# Patient Record
Sex: MALE | Race: WHITE | NOT HISPANIC OR LATINO | Employment: FULL TIME | URBAN - METROPOLITAN AREA
[De-identification: names, ages, dates, MRNs, and addresses within clinical notes are randomized per-mention and may not be internally consistent; named-entity substitution may affect disease eponyms.]

---

## 2018-05-15 ENCOUNTER — HOSPITAL ENCOUNTER (OUTPATIENT)
Dept: CARDIOLOGY | Facility: HOSPITAL | Age: 38
Discharge: HOME | End: 2018-05-15
Attending: FAMILY MEDICINE | Admitting: FAMILY MEDICINE

## 2019-03-18 ENCOUNTER — CONVERSION ENCOUNTER (OUTPATIENT)
Dept: FAMILY MEDICINE CLINIC | Facility: CLINIC | Age: 39
End: 2019-03-18

## 2019-03-18 LAB
ALBUMIN SERPL-MCNC: 4 G/DL (ref 3.6–5.1)
ALP SERPL-CCNC: 56 U/L (ref 40–115)
ALT SERPL-CCNC: 19 U/L (ref 9–46)
AST SERPL-CCNC: 22 U/L (ref 10–40)
BASOPHILS # BLD AUTO: 61 CELLS/UL (ref 0–200)
BASOPHILS NFR BLD AUTO: 1.1 %
BILIRUB SERPL-MCNC: 0.6 MG/DL (ref 0.2–1.2)
BUN SERPL-MCNC: 17 MG/DL (ref 7–25)
BUN/CREAT SERPL: NORMAL (CALC) (ref 6–22)
CALCIUM SERPL-MCNC: 8.8 MG/DL (ref 8.6–10.3)
CHLORIDE SERPL-SCNC: 107 MMOL/L (ref 98–110)
CHOLEST SERPL-MCNC: 150 MG/DL
CHOLEST/HDLC SERPL: 2.9 (CALC)
CONV CO2: 26 MMOL/L (ref 20–32)
CONV TOTAL PROTEIN: 6.5 G/DL (ref 6.1–8.1)
CREAT UR-MCNC: 1.05 MG/DL (ref 0.6–1.35)
EOSINOPHIL # BLD AUTO: 171 CELLS/UL (ref 15–500)
EOSINOPHIL # BLD AUTO: 3.1 %
ERYTHROCYTE [DISTWIDTH] IN BLOOD BY AUTOMATED COUNT: 12.7 % (ref 11–15)
GLOBULIN UR ELPH-MCNC: 2.5 MG/DL (ref 1.9–3.7)
GLUCOSE UR QL: 94 MG/DL (ref 65–99)
HBA1C MFR BLD: 5.5 %
HCT VFR BLD AUTO: 39.3 % (ref 38.5–50)
HDLC SERPL-MCNC: 52 MG/DL
HGB BLD-MCNC: 13 G/DL (ref 13.2–17.1)
INSULIN SERPL-ACNC: 1.6 (CALC) (ref 1–2.5)
LDLC SERPL CALC-MCNC: 81 MG/DL
LYMPHOCYTES # BLD AUTO: 1898 CELLS/UL (ref 850–3900)
LYMPHOCYTES NFR BLD AUTO: 34.5 %
MCH RBC QN AUTO: 29.9 PG (ref 27–33)
MCHC RBC AUTO-ENTMCNC: 33.1 G/DL (ref 32–36)
MCV RBC AUTO: 90.3 FL (ref 80–100)
MONOCYTES # BLD AUTO: 429 CELLS/UL (ref 200–950)
MONOCYTES NFR BLD AUTO: 7.8 %
NEUTROPHILS # BLD AUTO: 2943 CELLS/UL (ref 1500–7800)
NEUTROPHILS NFR BLD AUTO: 53.5 %
NONHDLC SERPL-MCNC: 98 MG/DL
PLATELET # BLD AUTO: 319 10*3/UL (ref 140–400)
PMV BLD AUTO: 10.8 FL (ref 7.5–12.5)
POTASSIUM SERPL-SCNC: 4.2 MMOL/L (ref 3.5–5.3)
PSA SERPL-MCNC: 0.8 NG/ML
RBC # BLD AUTO: 4.35 MILLION/UL (ref 4.2–5.8)
SODIUM SERPL-SCNC: 140 MMOL/L (ref 135–146)
TESTOST FREE MFR SERPL: 83.2 PG/ML (ref 35–155)
TESTOST SERPL-MCNC: 792 NG/DL (ref 250–1100)
TRIGL SERPL-MCNC: 87 MG/DL
WBC # BLD AUTO: 5.5 10*3/UL (ref 3.8–10.8)

## 2019-07-15 ENCOUNTER — OFFICE VISIT (OUTPATIENT)
Dept: FAMILY MEDICINE CLINIC | Facility: CLINIC | Age: 39
End: 2019-07-15

## 2019-07-15 VITALS
BODY MASS INDEX: 25.04 KG/M2 | TEMPERATURE: 97.7 F | HEART RATE: 70 BPM | SYSTOLIC BLOOD PRESSURE: 118 MMHG | RESPIRATION RATE: 18 BRPM | HEIGHT: 75 IN | WEIGHT: 201.4 LBS | DIASTOLIC BLOOD PRESSURE: 72 MMHG | OXYGEN SATURATION: 99 %

## 2019-07-15 DIAGNOSIS — Z11.3 SCREEN FOR STD (SEXUALLY TRANSMITTED DISEASE): ICD-10-CM

## 2019-07-15 DIAGNOSIS — N48.9 PENILE LESION: Primary | ICD-10-CM

## 2019-07-15 PROBLEM — I10 HYPERTENSION: Status: ACTIVE | Noted: 2019-07-15

## 2019-07-15 PROBLEM — F43.23 SITUATIONAL MIXED ANXIETY AND DEPRESSIVE DISORDER: Status: ACTIVE | Noted: 2019-07-15

## 2019-07-15 PROCEDURE — 99213 OFFICE O/P EST LOW 20 MIN: CPT | Performed by: FAMILY MEDICINE

## 2019-07-15 RX ORDER — VALACYCLOVIR HYDROCHLORIDE 1 G/1
1000 TABLET, FILM COATED ORAL EVERY 12 HOURS
Qty: 20 TABLET | Refills: 0 | Status: SHIPPED | OUTPATIENT
Start: 2019-07-15 | End: 2019-07-25

## 2019-07-15 NOTE — PROGRESS NOTES
Chief Complaint   Patient presents with   • STD testing     Patient requesting to be tested, possible exposure.   • Back Pain   • Skin Lesion       Subjective   Clifford Gallo is a 39 y.o. male.     Patient presents today to be tested for STDs. He feels he may have been exposed because of current symptoms. He has been experiencing low back pain in the tail bone region starting this past Friday and lesions in scrotum region and inner part of the thigh.  Lesions have been sore.  He does not have a previous history of genital herpes or cold cores.        Back Pain   This is a new problem. The current episode started in the past 7 days. The problem has been gradually improving since onset. The pain is present in the sacro-iliac. The quality of the pain is described as shooting. The pain does not radiate. The pain is at a severity of 8/10. The pain is moderate. The symptoms are aggravated by position. Pertinent negatives include no abdominal pain, dysuria, fever, leg pain, numbness, paresthesias, pelvic pain or perianal numbness. He has tried NSAIDs for the symptoms.      I have reviewed and updated his medications, medical history and problem list during today's office visit.     Social History     Tobacco Use   • Smoking status: Never Smoker   • Smokeless tobacco: Never Used   Substance Use Topics   • Alcohol use: Yes       Review of Systems   Constitutional: Negative for fever.   HENT: Negative for mouth sores.    Respiratory: Negative for shortness of breath.    Gastrointestinal: Negative for abdominal pain.   Endocrine: Negative for polydipsia and polyuria.   Genitourinary: Positive for genital sores. Negative for decreased urine volume, dysuria, hematuria, nocturia, pelvic pain, penile swelling, scrotal swelling and testicular pain.   Musculoskeletal: Positive for back pain. Negative for joint swelling and myalgias.   Skin: Positive for skin lesions.   Neurological: Negative for numbness and paresthesias.  "      Objective   Vitals:    07/15/19 0901   BP: 118/72   BP Location: Left arm   Patient Position: Sitting   Cuff Size: Adult   Pulse: 70   Resp: 18   Temp: 97.7 °F (36.5 °C)   TempSrc: Oral   SpO2: 99%   Weight: 91.4 kg (201 lb 6.4 oz)   Height: 190.5 cm (75\")     Body mass index is 25.17 kg/m².  Physical Exam   Constitutional: He is oriented to person, place, and time. He appears well-developed and well-nourished.   HENT:   Head: Normocephalic and atraumatic.   Eyes: Conjunctivae and EOM are normal. Pupils are equal, round, and reactive to light.   Neck: Normal range of motion. Neck supple. No JVD present.   Cardiovascular: Normal rate and regular rhythm.   Pulmonary/Chest: Effort normal and breath sounds normal.   Abdominal: Soft.   Genitourinary: Circumcised. No penile erythema. No discharge found.         Musculoskeletal: He exhibits no edema.   Neurological: He is alert and oriented to person, place, and time.       Assessment/Plan       Diagnoses and all orders for this visit:    1. Penile lesion (Primary)  Comments:  Check culture.  Start treatment.  If negative, plan for blood antibody testing.  If positive, consider daily prophylactic medication.  Orders:  -     Herpes Simplex Virus Culture - Swab, Penis  -     valACYclovir (VALTREX) 1000 MG tablet; Take 1 tablet by mouth Every 12 (Twelve) Hours for 10 days.  Dispense: 20 tablet; Refill: 0    2. Screen for STD (sexually transmitted disease)  -     Chlamydia trachomatis, Neisseria gonorrhoeae, PCR - Urine, Urine, Clean Catch          Return if symptoms worsen or fail to improve.                          "

## 2019-07-17 ENCOUNTER — TELEPHONE (OUTPATIENT)
Dept: FAMILY MEDICINE CLINIC | Facility: CLINIC | Age: 39
End: 2019-07-17

## 2019-07-17 LAB
C TRACH RRNA SPEC QL NAA+PROBE: NORMAL
HSV SPEC CULT: POSITIVE
N GONORRHOEA RRNA SPEC QL NAA+PROBE: NORMAL
REQUEST PROBLEM: NORMAL

## 2019-07-17 NOTE — TELEPHONE ENCOUNTER
LMOM for patient to return my call.    ----- Message from Francisca Miller MD sent at 7/17/2019  3:30 PM EDT -----  Can you please let him know that his herpes testing is positive.  Complete the treatment and consider if he wants to treat as needed or daily to prevent.  If daily, I will sent in a new rx for him.  Thanks.

## 2019-07-18 ENCOUNTER — TELEPHONE (OUTPATIENT)
Dept: FAMILY MEDICINE CLINIC | Facility: CLINIC | Age: 39
End: 2019-07-18

## 2019-07-18 DIAGNOSIS — A60.01 HERPES SIMPLEX INFECTION OF PENIS: Primary | ICD-10-CM

## 2019-07-18 RX ORDER — VALACYCLOVIR HYDROCHLORIDE 500 MG/1
500 TABLET, FILM COATED ORAL 2 TIMES DAILY
Qty: 180 TABLET | Refills: 3 | Status: SHIPPED | OUTPATIENT
Start: 2019-07-18 | End: 2020-08-02

## 2019-07-18 NOTE — TELEPHONE ENCOUNTER
I spoke to Koffi today and notified him of the positive herpes result.  He's currently taking the valtrex twice daily Rx that you'd sent in for him, and he's agreeable to take it daily for further treatment.  I thought you'd said you sent the rx in for him already but [facility] doesn't have anything further for him to .  Also, he said he had some further questions that he didn't feel comfortable asking me and was wondering if there was any way you could call him whenever you get a chance so he could speak to you personally.  Thank you!

## 2020-06-22 ENCOUNTER — OFFICE VISIT (OUTPATIENT)
Dept: FAMILY MEDICINE CLINIC | Facility: CLINIC | Age: 40
End: 2020-06-22

## 2020-06-22 VITALS
BODY MASS INDEX: 27.01 KG/M2 | TEMPERATURE: 98.4 F | HEART RATE: 64 BPM | HEIGHT: 75 IN | OXYGEN SATURATION: 99 % | DIASTOLIC BLOOD PRESSURE: 68 MMHG | SYSTOLIC BLOOD PRESSURE: 125 MMHG | WEIGHT: 217.2 LBS

## 2020-06-22 DIAGNOSIS — K64.8 INTERNAL HEMORRHOID: ICD-10-CM

## 2020-06-22 DIAGNOSIS — K62.5 RECTAL BLEEDING: Primary | ICD-10-CM

## 2020-06-22 PROCEDURE — 99213 OFFICE O/P EST LOW 20 MIN: CPT | Performed by: FAMILY MEDICINE

## 2020-06-22 PROCEDURE — 36415 COLL VENOUS BLD VENIPUNCTURE: CPT | Performed by: FAMILY MEDICINE

## 2020-06-24 LAB
ALBUMIN SERPL-MCNC: 4.6 G/DL (ref 4–5)
ALBUMIN/GLOB SERPL: 1.8 {RATIO} (ref 1.2–2.2)
ALP SERPL-CCNC: 66 IU/L (ref 39–117)
ALT SERPL-CCNC: 17 IU/L (ref 0–44)
AST SERPL-CCNC: 18 IU/L (ref 0–40)
BASOPHILS # BLD AUTO: 0.1 X10E3/UL (ref 0–0.2)
BASOPHILS NFR BLD AUTO: 1 %
BILIRUB SERPL-MCNC: 0.5 MG/DL (ref 0–1.2)
BUN SERPL-MCNC: 13 MG/DL (ref 6–24)
BUN/CREAT SERPL: 11 (ref 9–20)
CALCIUM SERPL-MCNC: 8.9 MG/DL (ref 8.7–10.2)
CHLORIDE SERPL-SCNC: 101 MMOL/L (ref 96–106)
CO2 SERPL-SCNC: 25 MMOL/L (ref 20–29)
CREAT SERPL-MCNC: 1.23 MG/DL (ref 0.76–1.27)
EOSINOPHIL # BLD AUTO: 0.1 X10E3/UL (ref 0–0.4)
EOSINOPHIL NFR BLD AUTO: 2 %
ERYTHROCYTE [DISTWIDTH] IN BLOOD BY AUTOMATED COUNT: 12.3 % (ref 11.6–15.4)
ERYTHROCYTE [SEDIMENTATION RATE] IN BLOOD BY WESTERGREN METHOD: 2 MM/HR (ref 0–15)
GLOBULIN SER CALC-MCNC: 2.6 G/DL (ref 1.5–4.5)
GLUCOSE SERPL-MCNC: NORMAL MG/DL
HCT VFR BLD AUTO: 42.4 % (ref 37.5–51)
HGB BLD-MCNC: 13.6 G/DL (ref 13–17.7)
IMM GRANULOCYTES # BLD AUTO: 0 X10E3/UL (ref 0–0.1)
IMM GRANULOCYTES NFR BLD AUTO: 0 %
LYMPHOCYTES # BLD AUTO: 2.2 X10E3/UL (ref 0.7–3.1)
LYMPHOCYTES NFR BLD AUTO: 38 %
MCH RBC QN AUTO: 30.2 PG (ref 26.6–33)
MCHC RBC AUTO-ENTMCNC: 32.1 G/DL (ref 31.5–35.7)
MCV RBC AUTO: 94 FL (ref 79–97)
MONOCYTES # BLD AUTO: 0.4 X10E3/UL (ref 0.1–0.9)
MONOCYTES NFR BLD AUTO: 6 %
NEUTROPHILS # BLD AUTO: 3 X10E3/UL (ref 1.4–7)
NEUTROPHILS NFR BLD AUTO: 53 %
PLATELET # BLD AUTO: 295 X10E3/UL (ref 150–450)
POTASSIUM SERPL-SCNC: NORMAL MMOL/L
PROT SERPL-MCNC: 7.2 G/DL (ref 6–8.5)
RBC # BLD AUTO: 4.5 X10E6/UL (ref 4.14–5.8)
SODIUM SERPL-SCNC: 141 MMOL/L (ref 134–144)
WBC # BLD AUTO: 5.7 X10E3/UL (ref 3.4–10.8)

## 2020-06-25 ENCOUNTER — TELEPHONE (OUTPATIENT)
Dept: FAMILY MEDICINE CLINIC | Facility: CLINIC | Age: 40
End: 2020-06-25

## 2020-06-25 NOTE — TELEPHONE ENCOUNTER
----- Message from Francisca Miller MD sent at 6/24/2020  9:39 AM EDT -----  Please let patient know all his labs are normal.  Thanks.

## 2020-08-02 DIAGNOSIS — A60.01 HERPES SIMPLEX INFECTION OF PENIS: ICD-10-CM

## 2020-08-02 RX ORDER — VALACYCLOVIR HYDROCHLORIDE 500 MG/1
TABLET, FILM COATED ORAL
Qty: 60 TABLET | Refills: 5 | Status: SHIPPED | OUTPATIENT
Start: 2020-08-02

## 2024-11-05 ENCOUNTER — OFFICE VISIT (OUTPATIENT)
Dept: FAMILY MEDICINE CLINIC | Facility: CLINIC | Age: 44
End: 2024-11-05
Payer: COMMERCIAL

## 2024-11-05 ENCOUNTER — LAB (OUTPATIENT)
Dept: FAMILY MEDICINE CLINIC | Facility: CLINIC | Age: 44
End: 2024-11-05
Payer: COMMERCIAL

## 2024-11-05 ENCOUNTER — TELEPHONE (OUTPATIENT)
Dept: FAMILY MEDICINE CLINIC | Facility: CLINIC | Age: 44
End: 2024-11-05

## 2024-11-05 VITALS
SYSTOLIC BLOOD PRESSURE: 118 MMHG | BODY MASS INDEX: 27.5 KG/M2 | HEART RATE: 59 BPM | OXYGEN SATURATION: 99 % | HEIGHT: 75 IN | DIASTOLIC BLOOD PRESSURE: 76 MMHG | WEIGHT: 221.2 LBS

## 2024-11-05 DIAGNOSIS — Z00.00 WELLNESS EXAMINATION: Primary | ICD-10-CM

## 2024-11-05 DIAGNOSIS — Z13.1 SCREENING FOR DIABETES MELLITUS: ICD-10-CM

## 2024-11-05 DIAGNOSIS — Z13.220 SCREENING FOR HYPERLIPIDEMIA: ICD-10-CM

## 2024-11-05 DIAGNOSIS — Z12.12 SCREENING FOR COLORECTAL CANCER: Primary | ICD-10-CM

## 2024-11-05 DIAGNOSIS — Z11.59 NEED FOR HEPATITIS C SCREENING TEST: ICD-10-CM

## 2024-11-05 DIAGNOSIS — Z12.11 SCREENING FOR COLORECTAL CANCER: Primary | ICD-10-CM

## 2024-11-05 DIAGNOSIS — Z86.79 HISTORY OF HYPERTENSION: ICD-10-CM

## 2024-11-05 PROBLEM — I10 HYPERTENSION: Status: RESOLVED | Noted: 2019-07-15 | Resolved: 2024-11-05

## 2024-11-05 LAB
ANION GAP SERPL CALCULATED.3IONS-SCNC: 9.2 MMOL/L (ref 5–15)
BUN SERPL-MCNC: 10 MG/DL (ref 6–20)
BUN/CREAT SERPL: 7.8 (ref 7–25)
CALCIUM SPEC-SCNC: 9 MG/DL (ref 8.6–10.5)
CHLORIDE SERPL-SCNC: 103 MMOL/L (ref 98–107)
CHOLEST SERPL-MCNC: 198 MG/DL (ref 0–200)
CO2 SERPL-SCNC: 26.8 MMOL/L (ref 22–29)
CREAT SERPL-MCNC: 1.28 MG/DL (ref 0.76–1.27)
EGFRCR SERPLBLD CKD-EPI 2021: 70.3 ML/MIN/1.73
GLUCOSE SERPL-MCNC: 93 MG/DL (ref 65–99)
HBA1C MFR BLD: 5.6 % (ref 4.8–5.6)
HCV AB SER QL: NORMAL
HDLC SERPL-MCNC: 40 MG/DL (ref 40–60)
LDLC SERPL CALC-MCNC: 145 MG/DL (ref 0–100)
LDLC/HDLC SERPL: 3.6 {RATIO}
POTASSIUM SERPL-SCNC: 4.8 MMOL/L (ref 3.5–5.2)
SODIUM SERPL-SCNC: 139 MMOL/L (ref 136–145)
TRIGL SERPL-MCNC: 71 MG/DL (ref 0–150)
VLDLC SERPL-MCNC: 13 MG/DL (ref 5–40)

## 2024-11-05 PROCEDURE — 80048 BASIC METABOLIC PNL TOTAL CA: CPT | Performed by: STUDENT IN AN ORGANIZED HEALTH CARE EDUCATION/TRAINING PROGRAM

## 2024-11-05 PROCEDURE — 86803 HEPATITIS C AB TEST: CPT | Performed by: STUDENT IN AN ORGANIZED HEALTH CARE EDUCATION/TRAINING PROGRAM

## 2024-11-05 PROCEDURE — 83036 HEMOGLOBIN GLYCOSYLATED A1C: CPT | Performed by: STUDENT IN AN ORGANIZED HEALTH CARE EDUCATION/TRAINING PROGRAM

## 2024-11-05 PROCEDURE — 36415 COLL VENOUS BLD VENIPUNCTURE: CPT | Performed by: STUDENT IN AN ORGANIZED HEALTH CARE EDUCATION/TRAINING PROGRAM

## 2024-11-05 PROCEDURE — 80061 LIPID PANEL: CPT | Performed by: STUDENT IN AN ORGANIZED HEALTH CARE EDUCATION/TRAINING PROGRAM

## 2024-11-05 PROCEDURE — 99386 PREV VISIT NEW AGE 40-64: CPT | Performed by: STUDENT IN AN ORGANIZED HEALTH CARE EDUCATION/TRAINING PROGRAM

## 2024-11-05 NOTE — TELEPHONE ENCOUNTER
With Provider: Bib Awan [-Primary Care Physician-]     Preferred Date Range: Any date 11/5/2024 or later     Preferred Times: Any     Reason: To address the following health maintenance concerns.  Colorectal Cancer Screening     Comments:  After thinking it over, I would like to proceed with ordering and getting a colonoscopy. Please let me know what I need to do next to schedule this. Thx

## 2024-11-05 NOTE — PROGRESS NOTES
"Chief Complaint  Chief Complaint   Patient presents with    [facility] Care    Hypertension     Annual          Subjective        Clifford Gallo is a 44 y.o. male who presents to [facility].  History of Present Illness    Clifford is a 44-year-old male here for wellness.      Wellness  Diet: Mostly home-cooked meals.  Drinks lots of water.  Exercise: Very active with coaching basketball.  Previously was a runner but not recently.  Smoking: none  ETOH: Occasional  Works as .            Objective   /76   Pulse 59   Ht 190.5 cm (75\")   Wt 100 kg (221 lb 3.2 oz)   SpO2 99%   BMI 27.65 kg/m²     Estimated body mass index is 27.65 kg/m² as calculated from the following:    Height as of this encounter: 190.5 cm (75\").    Weight as of this encounter: 100 kg (221 lb 3.2 oz).     Physical Exam   GEN: In no acute distress, non toxic appearing  HEENT: EOMI. Mucous membranes moist. Oropharynx without erythema or exudate.   CV: Regular rate and rhythm, no murmurs. No extremity edema.   RESP: Lungs clear to auscultation bilaterally.  No signs of respiratory distress on room air.  SKIN: No rashes  MSK: No joint erythema, deformity, or effusion.   NEURO: AAO to person, place, and time. CN 2-12 intact grossly.   PSYCH: Affect normal, insight fair     PHQ-2 Depression Screening  Little interest or pleasure in doing things? Not at all   Feeling down, depressed, or hopeless? Not at all   PHQ-2 Total Score 0         Result Review :              Assessment and Plan     Diagnoses and all orders for this visit:    1. Wellness examination (Primary)  Overall doing well.    Immunizations  - Influenza: Up-to-date    Cancer screening  - CRC: Discussed options, patient will consider and call when he has made decision        2. History of hypertension  Reports previous diagnosis of hypertension but never on medication  Blood pressure great today-continue to monitor      3. Screening for " diabetes mellitus  -     Basic Metabolic Panel  -     Hemoglobin A1c    4. Need for hepatitis C screening test  -     Hepatitis C Antibody    5. Screening for hyperlipidemia  -     Lipid Panel            Follow Up     Return in about 1 year (around 11/5/2025) for Annual physical.

## 2024-11-07 ENCOUNTER — TELEPHONE (OUTPATIENT)
Dept: FAMILY MEDICINE CLINIC | Facility: CLINIC | Age: 44
End: 2024-11-07
Payer: COMMERCIAL

## 2024-11-07 NOTE — TELEPHONE ENCOUNTER
Name: Clifford Gallo      Relationship: Self      Best Callback Number: [phone]      HUB PROVIDED THE RELAY MESSAGE FROM THE OFFICE      PATIENT: VOICED UNDERSTANDING AND HAS NO FURTHER QUESTIONS AT THIS TIME    ADDITIONAL INFORMATION: PATIENT ASKED IF THERE WAS ANY UPDATES ON THE REFERRAL FOR COLONOSCOPY AND GI DOCTOR     PATIENT WOULD LIKE A CALLBACK

## 2024-11-07 NOTE — TELEPHONE ENCOUNTER
HUB TO RELAY: I left patient a VM to call back for results or check his mychart.   ----- Message from Bib Awan sent at 11/6/2024  4:44 PM EST -----    Please call patient with lab results.  Overall labs look good.  His cholesterol is slightly high but nothing to be concerned about at this time-would focus on healthy diet choices (limiting portion sizes, low-carb, less processed food) and regular physical activity.  We can plan to repeat labs in 1 year.